# Patient Record
Sex: FEMALE | Race: WHITE
[De-identification: names, ages, dates, MRNs, and addresses within clinical notes are randomized per-mention and may not be internally consistent; named-entity substitution may affect disease eponyms.]

---

## 2017-03-07 ENCOUNTER — HOSPITAL ENCOUNTER (OUTPATIENT)
Dept: HOSPITAL 47 - SLEEP | Age: 57
Discharge: HOME | End: 2017-03-07
Payer: COMMERCIAL

## 2017-03-07 DIAGNOSIS — Z79.84: ICD-10-CM

## 2017-03-07 DIAGNOSIS — G47.10: ICD-10-CM

## 2017-03-07 DIAGNOSIS — R00.0: ICD-10-CM

## 2017-03-07 DIAGNOSIS — G47.33: Primary | ICD-10-CM

## 2017-03-07 DIAGNOSIS — Z98.890: ICD-10-CM

## 2017-03-07 DIAGNOSIS — Z79.899: ICD-10-CM

## 2017-03-07 DIAGNOSIS — E78.5: ICD-10-CM

## 2017-03-07 DIAGNOSIS — I10: ICD-10-CM

## 2017-03-07 DIAGNOSIS — E11.9: ICD-10-CM

## 2017-03-07 DIAGNOSIS — E03.9: ICD-10-CM

## 2017-03-07 DIAGNOSIS — E66.9: ICD-10-CM

## 2017-03-07 DIAGNOSIS — Z88.8: ICD-10-CM

## 2017-03-07 DIAGNOSIS — Z85.850: ICD-10-CM

## 2017-03-07 PROCEDURE — 99211 OFF/OP EST MAY X REQ PHY/QHP: CPT

## 2017-03-07 NOTE — CONS
DATE OF CONSULTATION:  03/07/2017



REASON FOR CONSULTATION: Sleep apnea.



This 56-year-old female patient is a [place] [employer] [place] and she is coming 

in due to concerns about obstructive sleep apnea. She is obese and she 

has a BMI of 55.1. She also snores loudly. She had 2 occasions when 

she was waking up from sleep due to increased tachycardia and 

palpitations, and she thinks that these were SVTs. No official 

documentation through Holter monitoring has been done. The patient has 

consulted with Dr. Eugene, her cardiologist, who in turn recommended a 

sleep evaluation, knowing that these tachycardic events occurred while 

the patient was sleeping and it woke her up from sleep. In addition, 

the patient has obvious features that would suggest the possibility of 

obstructive sleep apnea. The patient typically goes to bed around 11 

p.m., wakes up at 6:30 a.m. in the morning. She has some degree of 

tiredness and fatigue and sleepiness and she has an Jacobsburg score of 

10. She prefers to sleep on her side, and she does not sleep on her 

back. She wakes up many times in the middle of the night and she 

counts at least 3 to 4 (      ) where she wakes up. No sleep 

paralysis. No hallucinations. No cataplexy. No restlessness in the 

lower extremities.  



PAST MEDICAL HISTORY: 

1. Obesity. 

2. Questionable nocturnal arrhythmias; possibly SVTs on 2 separate 

occasions.  

3. Hypertension. 

4. Hypothyroidism. 

5. Diabetes mellitus. 

6. Hyperlipidemia. 

7. History of thyroid cancer, status post thyroidectomy. The patient 

is being treated with thyroid hormone replacement.  



PAST SURGICAL HISTORY: None significant for the past 3 years. 



DRUG ALLERGIES: DIFLUCAN. 



Outpatient medication list includes: 

1. Lotrel. 

2. Synthroid. 

3. Vitamin D. 

4. Metoprolol. 

5. Metformin. 

6. Simvastatin. 



FAMILY HISTORY: Coronary artery disease in her father. 



SOCIAL HISTORY: The patient is a nonsmoker. No history of alcohol. No 

history of IV drugs. She is a [place] at [employer] [place]  



REVIEW OF SYSTEMS: Twelve-point review of systems was done. Positive 

findings were all mentioned above in the history of present illness.  



CURRENT VITALS: BP is 145/86, pulse 96, respirations 16, temperature 

97.8. Saturations are 96% on room air. Weight is 287. Height is 63-1/2 

inches. Neck size 17 inches. Jacobsburg score is at 10. BMI is 55.1.  

GENERAL APPEARANCE: Calm, comfortable, obese. 

HEENT: Negative for goiter or neck masses. Mallampati class IV. 

LUNGS: Clear to auscultation. 

HEART: Sounds are regular rate and rhythm. Normal S1, S2. No S3. No 

S4. No murmurs.  

ABDOMEN: Soft, nontender. No organomegaly. 

EXTREMITIES: There is no edema. There is no cyanosis or clubbing at 

this point.  



IMPRESSION: 

1. Obstructive sleep apnea suspected, currently under investigation. 

2. Nocturnal tachyarrhythmia, currently under investigation. 

3. Obesity with a body mass index of 55. 

4. Hypersomnia. Jacobsburg score of 10. 

5. Hypertension. 

6. Hypothyroidism. 

7. Thyroid cancer with previous thyroidectomy. 

8. Diabetes mellitus. 

9. Hyperlipidemia. 



PLAN: 

1. Encourage weight loss. 

2. Follow up on thyroid function tests through the primary care 

physician.  

3. Proceed with a polysomnogram to evaluate for any significant sleep 

breathing disorder that may need to be treated.

## 2017-03-30 ENCOUNTER — HOSPITAL ENCOUNTER (OUTPATIENT)
Dept: HOSPITAL 47 - RADMAMWWP | Age: 57
Discharge: HOME | End: 2017-03-30
Payer: COMMERCIAL

## 2017-03-30 DIAGNOSIS — Z12.31: Primary | ICD-10-CM

## 2017-03-30 PROCEDURE — 77063 BREAST TOMOSYNTHESIS BI: CPT

## 2017-04-03 NOTE — MM
Reason for exam: screening  (asymptomatic).

Last mammogram was performed 1 year and 2 months ago.



History:

Patient history of other cancer.

Benign excisional biopsy of the left breast, November 2004.  Benign stereotactic 

core biopsy of the left breast, October 9, 2003.  Benign stereotactic core biopsy 

of the left breast, May 5, 2000.  2 core biopsies of the left breast.

Taking unspecified hormones for 2 years beginning at age 45.



Physical Findings:

A clinical breast exam by your physician is recommended on an annual basis and 

results should be correlated with mammographic findings.



MG 3D Screening Mammo W/Cad

Bilateral CC and MLO view(s) were taken.

Prior study comparison: January 20, 2016, bilateral MG 3d screening mammo w/cad.  

July 28, 2014, bilateral MG screening mammo w CAD.

There are scattered fibroglandular densities.

Finding: There are typically benign calcifications in both breasts. Previous 

mammotome biopsy in the left breast.

No significant changes in finding since January 20, 2016 and July 28, 2014.





ASSESSMENT: Benign, BI-RAD 2



RECOMMENDATION:

Routine screening mammogram of both breasts in 1 year.

## 2017-08-01 ENCOUNTER — HOSPITAL ENCOUNTER (OUTPATIENT)
Dept: HOSPITAL 47 - SLEEP | Age: 57
End: 2017-08-01
Payer: COMMERCIAL

## 2017-08-01 DIAGNOSIS — E11.9: ICD-10-CM

## 2017-08-01 DIAGNOSIS — E78.5: ICD-10-CM

## 2017-08-01 DIAGNOSIS — G47.33: Primary | ICD-10-CM

## 2017-08-01 DIAGNOSIS — E03.9: ICD-10-CM

## 2017-08-01 DIAGNOSIS — C73: ICD-10-CM

## 2017-08-01 DIAGNOSIS — E66.9: ICD-10-CM

## 2017-08-01 DIAGNOSIS — I10: ICD-10-CM

## 2017-08-02 NOTE — PN
This patient is 56 years old and she is coming in for a compliancy check.  The 
patient was diagnosed having obstructive sleep apnea with an AHI of 21 and 
currently she is in an Auto CPAP unit.  She is doing well, no complaints.  (    
) depression is at 14.  She is averaging around 5.6 hours of CPAP use every 
night. Here CPAP is for more than 4 hours in 24 hours of the past 30 days.  AHI 
while on treatment was found to be 1.6.  She is looking for an alternative mask 
other than the one that she has and we were able to give her a new one, the 
nasal pillow based on our mask fitting today.  



BP is 155/74, pulse 80, respirations 16, weight is 280, BMI is 48.  Height is 5,
4. 

GENERAL APPEARANCE:  Calm, comfortable.

HEENT:  Short neck, crowding of posterior pharynx.  

LUNGS:  Diminished, otherwise clear. 

Heart sounds regular rate and rhythm, no S1, S2. 

ABDOMEN:  Soft, nontender. No organomegaly.

EXTREMITIES:  No edema, no cyanosis or clubbing.  



IMPRESSION:

1.  Symptomatic obstructive sleep apnea with an apnea-hypopnea index of 21.4 
with successful CPAP therapy. 

2.  Snoring, recovered.

3.  Obesity, body mass index is a 48.  

4.  Hypertension with hypothyroidism.

5.  Diabetes mellitus.  

6.  Hyperlipidemia.

7.  Thyroid cancer.



PLAN:  

1. Continue same setting in terms of CPAP therapy.

2. Encourage further weight loss.

3. Compliance data is adequate.

4. Will continue to follow.
[facility]

## 2018-08-27 ENCOUNTER — HOSPITAL ENCOUNTER (OUTPATIENT)
Dept: HOSPITAL 47 - RADMAMWWP | Age: 58
Discharge: HOME | End: 2018-08-27
Attending: FAMILY MEDICINE
Payer: COMMERCIAL

## 2018-08-27 DIAGNOSIS — Z12.31: Primary | ICD-10-CM

## 2018-08-27 PROCEDURE — 77063 BREAST TOMOSYNTHESIS BI: CPT

## 2018-08-27 PROCEDURE — 77067 SCR MAMMO BI INCL CAD: CPT

## 2018-08-29 NOTE — MM
Reason for exam: screening  (asymptomatic).

Last mammogram was performed 1 year and 5 months ago.



History:

Patient history of other cancer.

Benign excisional biopsy of the left breast, November 2004.  Benign stereotactic 

core biopsy of the left breast, October 9, 2003.  Benign stereotactic core biopsy 

of the left breast, May 5, 2000.  2 core biopsies of the left breast.

Taking unspecified hormones for 2 years beginning at age 45.



Physical Findings:

A clinical breast exam by your physician is recommended on an annual basis and 

results should be correlated with mammographic findings.



MG 3D Screening Mammo W/Cad

Bilateral CC and MLO view(s) were taken.

Prior study comparison: March 30, 2017, bilateral MG 3d screening mammo w/cad.  

January 20, 2016, bilateral MG 3d screening mammo w/cad.

There are scattered fibroglandular densities.  Previous mammotome biopsy in the 

left breast. There is chronic nodularity in the right breast.  No significant 

changes when compared with prior studies.





ASSESSMENT: Benign, BI-RAD 2



RECOMMENDATION:

Routine screening mammogram of both breasts in 1 year.

## 2020-03-13 ENCOUNTER — HOSPITAL ENCOUNTER (OUTPATIENT)
Dept: HOSPITAL 47 - RADMAMWWP | Age: 60
Discharge: HOME | End: 2020-03-13
Attending: FAMILY MEDICINE
Payer: COMMERCIAL

## 2020-03-13 DIAGNOSIS — Z12.31: Primary | ICD-10-CM

## 2020-03-13 PROCEDURE — 77067 SCR MAMMO BI INCL CAD: CPT

## 2020-03-13 PROCEDURE — 77063 BREAST TOMOSYNTHESIS BI: CPT

## 2020-03-16 NOTE — MM
Reason for exam: screening  (asymptomatic).

Last mammogram was performed 1 year and 7 months ago.



History:

Patient has history of other cancer at age 45.

Benign excisional biopsy of the left breast, November 2004.  Benign stereotactic 

core biopsy of the left breast, October 9, 2003.  Benign stereotactic core biopsy 

of the left breast, May 5, 2000.  2 core biopsies of the left breast.

Took unspecified hormones for 2 years beginning at age 45.



Physical Findings:

A clinical breast exam by your physician is recommended on an annual basis and 

results should be correlated with mammographic findings.



MG 3D Screening Mammo W/Cad

Bilateral CC and MLO view(s) were taken.

Prior study comparison: August 27, 2018, bilateral MG 3d screening mammo w/cad.  

March 30, 2017, bilateral MG 3d screening mammo w/cad.

The breast tissue is heterogeneously dense. This may lower the sensitivity of 

mammography.  No suspicious abnormality. Left biopsy marker noted.  No significant

changes when compared with prior studies.





ASSESSMENT: Negative, BI-RAD 1



RECOMMENDATION:

Routine screening mammogram of both breasts in 1 year.

## 2021-12-29 ENCOUNTER — HOSPITAL ENCOUNTER (OUTPATIENT)
Dept: HOSPITAL 47 - RADMAMWWP | Age: 61
Discharge: HOME | End: 2021-12-29
Attending: OBSTETRICS & GYNECOLOGY
Payer: COMMERCIAL

## 2021-12-29 DIAGNOSIS — N95.1: ICD-10-CM

## 2021-12-29 DIAGNOSIS — M85.80: ICD-10-CM

## 2021-12-29 DIAGNOSIS — Z12.31: Primary | ICD-10-CM

## 2021-12-29 PROCEDURE — 77080 DXA BONE DENSITY AXIAL: CPT

## 2021-12-29 PROCEDURE — 77063 BREAST TOMOSYNTHESIS BI: CPT

## 2021-12-29 PROCEDURE — 77067 SCR MAMMO BI INCL CAD: CPT

## 2021-12-29 NOTE — BD
EXAMINATION TYPE: Axial Bone Density

 

DATE OF EXAM: 12/29/2021

 

COMPARISON: NONE

 

CLINICAL HISTORY:

 

Height:  64

Weight:  300.4

 

FRAX RISK QUESTIONS:

Alcohol (3 or more units per day):  no

Family History (Parent hip fracture):  no

Glucocorticoids (More than 3mos):  no

           (Ex: prednisone, prednisolone, methylprednisolone, dexamethasone, and hydrocortisone).    
     

History of Fracture in Adulthood: yes

Secondary Osteoporosis:

  1.  Type 1 Diabetes: no

  2.  Hyperthyroidism: no

  3.  Menopause before 45: yes

  4.  Malnutrition: no

  5.  Chronic liver disease: no

Rheumatoid Arthritis: no

Current Tobacco Use: no

 

RISK FACTORS 

HISTORY OF: 

Surgery to Spine/Hip(right/left)/Wrist (right/left): no

Family History of Osteoporosis: no

Active: yes

Diet low in dairy products/other sources of calcium:  no

Postmenopausal woman: yes

Lost more than 2 inches in height since high school: no

Adrenal Insufficiency: no

 

MEDICATIONS: vit d, water pill, blood pressure meds, crestor, 

Thyroid Medications:  synthroid

How Long: since 2006

 

 

Additional History: thyroid cancer 2006

 

 

EXAM MEASUREMENTS: 

Bone mineral densitometry was performed using the [employer] System.

Bone mineral density as measured about the Lumbar spine is:  

----- L1-L4(G/cm2): 1.141

T Score Values are as follows:

----- L2: -1.5

----- L3: 0.0

----- L4: 0.9

----- L1-L4: -0.3

Bone mineral density : baseline

 

Bone mineral density about the R hip (g/cm2): 0.945

Bone mineral density about the L hip (g/cm2): 0.838

T Score values are as follows:

-----R Neck: -0.7

-----L Neck: -1.4

-----R Total: -0.3

-----L Total: -0.8

Bone mineral density : baseline

 

 

IMPRESSION:

Osteopenia (T Score between -2.5 and -1).

 

There is slightly increased risk of fracture and the patient may be considered 

for treatment. 

 

Re-Screen 2-5 years.

 

NOTE:  T-SCORE=SD OF THE YOUNG ADULT MEAN.

## 2021-12-31 NOTE — MM
Reason for exam: screening  (asymptomatic).

Last mammogram was performed 1 year and 10 months ago.



History:

Patient is postmenopausal and has history of other cancer at age 45.

Benign excisional biopsy of the left breast, November 2004.  Benign stereotactic 

core biopsy of the left breast, October 9, 2003.  Benign stereotactic core biopsy 

of the left breast, May 5, 2000.  2 core biopsies of the left breast.

Took unspecified hormones for 2 years beginning at age 45.



Physical Findings:

A clinical breast exam by your physician is recommended on an annual basis and 

results should be correlated with mammographic findings.



MG 3D Screening Mammo W/Cad

Bilateral CC and MLO view(s) were taken.

Prior study comparison: March 13, 2020, bilateral MG 3d screening mammo w/cad.  

August 27, 2018, bilateral MG 3d screening mammo w/cad.

There are scattered fibroglandular densities.  Previous mammotome biopsy in the 

left breast. There is no discrete abnormality.





ASSESSMENT: Negative, BI-RAD 1



RECOMMENDATION:

Routine screening mammogram of both breasts in 1 year.